# Patient Record
Sex: MALE | Race: WHITE | Employment: FULL TIME | URBAN - METROPOLITAN AREA
[De-identification: names, ages, dates, MRNs, and addresses within clinical notes are randomized per-mention and may not be internally consistent; named-entity substitution may affect disease eponyms.]

---

## 2017-03-24 ENCOUNTER — HOSPITAL ENCOUNTER (OUTPATIENT)
Dept: LAB | Age: 67
Discharge: HOME | End: 2017-03-24

## 2017-03-24 PROCEDURE — 88305 TISSUE EXAM BY PATHOLOGIST: CPT | Performed by: INTERNAL MEDICINE

## 2017-06-14 ENCOUNTER — HOSPITAL ENCOUNTER (OUTPATIENT)
Dept: PHYSICAL THERAPY | Age: 67
Discharge: HOME | End: 2017-06-14
Attending: FAMILY MEDICINE
Payer: COMMERCIAL

## 2017-06-14 DIAGNOSIS — M54.2 CERVICALGIA: ICD-10-CM

## 2017-06-14 PROCEDURE — 97110 THERAPEUTIC EXERCISES: CPT

## 2017-06-14 PROCEDURE — 97162 PT EVAL MOD COMPLEX 30 MIN: CPT

## 2017-06-14 NOTE — PROGRESS NOTES
Ambulatory/Rehab Services H2 Model Falls Risk Assessment    Risk Factor Pts. ·   Confusion/Disorientation/Impulsivity  []    4 ·   Symptomatic Depression  []   2 ·   Altered Elimination  []   1 ·   Dizziness/Vertigo  []   1 ·   Gender (Male)  [x]   1 ·   Any administered antiepileptics (anticonvulsants):  []   2 ·   Any administered benzodiazepines:  []   1 ·   Visual Impairment (specify):  []   1 ·   Portable Oxygen Use  []   1 ·   Orthostatic ? BP  []   1 ·   History of Recent Falls (within 3 mos.)  []   5     Ability to Rise from Chair (choose one) Pts. ·   Ability to rise in a single movement  []   0 ·   Pushes up, successful in one attempt  []   1 ·   Multiple attempts, but successful  []   3 ·   Unable to rise without assistance  []   4   Total: (5 or greater = High Risk) 1     Falls Prevention Plan:   []                Physical Limitations to Exercise (specify):   []                Mobility Assistance Device (type):   []                Exercise/Equipment Adaptation (specify):    ©2010 [facility] [place] Patent #[id number].  Federal Law prohibits the replication, distribution or use without written permission from [facility] [employer]

## 2017-06-14 NOTE — PROGRESS NOTES
Rainer Munguia  : 1950 [facility] [place] [facility] [address] Therapy  [address], [address]  [phone]   [phone]          OUTPATIENT PHYSICAL THERAPY:Initial Assessment 2017    ICD-10: Treatment Diagnosis: M54.2  Cervicalgia  Precautions/Allergies:   Pcn [penicillins]   Fall Risk Score: 1 (? 5 = High Risk)  MD Orders: eval and treat MEDICAL/REFERRING DIAGNOSIS:  Cervicalgia [M54.2]   DATE OF ONSET: about 2 months ago  REFERRING PHYSICIAN: Kenna Rojas MD  RETURN PHYSICIAN APPOINTMENT: TBD     INITIAL ASSESSMENT:  Mr. Valerie Perkins presents with pain in the R arm, from the base of the neck/upper trap area to the hand, most commonly the lateral aspect of the hand. The pain is unchanging since onset, came on for no apparent reason,  but intermittent. At present the pain is more in the arm than the neck. It is worse early in the day and gets better as the day goes. No particular position or activity seem to make it better or worse. But it does feel some better if he stretches it, with a traction-type pull. The pain does interrupt his sleeping. He reported that recent xrays did show a possible old compression fx in the cervical region. He did have R rotator cuff repair, labral repair in , and still has some shoulder pain from that that seems to confuse his current symptoms. PROBLEM LIST (Impacting functional limitations):  1. Decreased Strength  2. Decreased ADL/Functional Activities  3. Increased Pain  4. Decreased Activity Tolerance  5. Decreased Flexibility/Joint Mobility  6. Decreased [place] with Home Exercise Program INTERVENTIONS PLANNED:  1. Home Exercise Program (HEP)  2. Manual Therapy  3. Range of Motion (ROM)  4. Therapeutic Exercise/Strengthening  5. Modalities if needed/appropriate   TREATMENT PLAN:  Effective Dates: 2017 TO 9-.   Frequency/Duration: 2 times a week for 6 weeks, and upon reassessment will adjust frequency and duration as progress indicates. GOALS: (Goals have been discussed and agreed upon with patient.)  :   SHORT-TERM FUNCTIONAL GOALS: Time Frame: 3 weeks  Decrease pain by 3/10 to improve ease with driving and sleeping. Improve cervical ROM by 15% for improved function with ADL's  Establish independence in HEP with minimal cueing  Be able to demonstrate correct sitting posture to minimize spinal stress  DISCHARGE GOALS: Time Frame: 12 weeks  Decrease pain to <3/10 for greater ease with driving and light lifting ADL's  Improve cervical AROM to minimal limitation (or better) for all cervical & UE ADL's  Improve score on NDI by > 5 points to allow progression to independent program  Progression to advanced HEP with no cueing to allow discharge from therapy. Rehabilitation Potential For Stated Goals: Good  Regarding Kashmir Guerra's therapy, I certify that the treatment plan above will be carried out by a therapist or under their direction. Thank you for this referral,  Vida Morgan PT     Referring Physician Signature: Lakisha Joe MD              Date                    The information in this section was collected on 6- (except where otherwise noted). HISTORY:   History of Present Injury/Illness (Reason for Referral):  Pt reported that he developed neck and R arm pain for no apparent reason about 2 months ago. The pain has been unchanging since then, intermittent, but significant. Currently the pain in the R arm is worse than the neck pain. He denies any tingling, just radiating pain. H has not found any one motion or position that makes the pain worse, but when he stretches the arm with a traction force it is better. He does have increased stiffness in the neck region, much worse in the morning. Past Medical History/Comorbidities:   Mr. Poly Hylton  has a past medical history of AR (allergic rhinitis); Dyslipidemia; and Pathologic fracture, unspecified site.   Mr. Poly Hylton  has a past surgical history that includes knee arthroscopy (1998); mohs procedure (Right, 2016); and shoulder arthroscopy (Right, 2015). Social History/Living Environment:     . Lives in 1 level home  Prior Level of Function/Work/Activity:  Works as an  but is up and down more than long prolonged sitting  Dominant Side:         RIGHT  Current Medications:  Pt took Naproxen for about 2 weeks but it did not help and other sx made him stop     Current Outpatient Prescriptions:     naproxen (NAPROSYN) 500 mg tablet, 1 po bid, Disp: 60 Tab, Rfl: 11    atorvastatin (LIPITOR) 10 mg tablet, 1/2 po qd, Disp: 30 Tab, Rfl: 11    triamcinolone acetonide (KENALOG) 0.1 % topical cream, Apply  to affected area two (2) times a day., Disp: 45 g, Rfl: 11   Date Last Reviewed:  6/14/2017     Number of Personal Factors/Comorbidities that affect the Plan of Care: 1-2: MODERATE COMPLEXITY   EXAMINATION:   Observation/Orthostatic Postural Assessment:          Pt moves without apparent pain in transitions. Seated posture is Poor, with forward head and upper cervical hyperextension. Correction of posture had no effect on sx. ROM:          Pt has Moderate limitations of retraction and extension. Moderate to minimal limitations of rotation to R and L. Min limitations of lateral flexion L and R.   Strength:          R shoulder flexion and abd and ER are slightly weaker than L, due to RCR, probably not due to any cervical pathology. Body Structures Involved:  1. Nerves  2. Bones  3. Joints  4. Muscles Body Functions Affected:  1. Sensory/Pain  2. Neuromusculoskeletal  3. Movement Related Activities and Participation Affected:  1. General Tasks and Demands  2. Self Care  3. Domestic Life  4.  Community, Social and [place]   Number of elements (examined above) that affect the Plan of Care: 3: MODERATE COMPLEXITY   CLINICAL PRESENTATION:   Presentation: Evolving clinical presentation with changing clinical characteristics: MODERATE COMPLEXITY   CLINICAL DECISION MAKING:   Outcome Measure: Tool Used: Neck Disability Index (NDI)  Score:  Initial: 2/50  Most Recent: X/50 (Date: -- )   Interpretation of Score: The Neck Disability Index is a revised form of the Oswestry Low Back Pain Index and is designed to measure the activities of daily living in person's with neck pain. Each section is scored on a 0-5 scale, 5 representing the greatest disability. The scores of each section are added together for a total score of 50. Score 0 1-10 11-20 21-30 31-40 41-49 50   Modifier CH CI CJ CK CL CM CN       Medical Necessity:   · Skilled intervention continues to be required due to neck and arm pain that interfere with work, leisure, comfort. .  Reason for Services/Other Comments:  · Patient continues to demonstrate capacity to improve flexibility and strength which will decrease pain and improve functional mobility and comfort. .   Use of outcome tool(s) and clinical judgement create a POC that gives a: Questionable prediction of patient's progress: MODERATE COMPLEXITY            TREATMENT:   (In addition to Assessment/Re-Assessment sessions the following treatments were rendered)  Pre-treatment Symptoms/Complaints:  Pt reports R shoulder and arm pain that seems to be coming from the neck. Pain: Initial:   Pain Intensity 1: 1  Post Session:  0     Evaluation  (X)  Therapeutic Exercise (  20 min):   to decrease pain, improve flexibility and motion, and increase strength. Will provide verbal and manual cues as needed to ensure proper performance of the exercises. Will increase range of motion, resistance and intensity as pt tolerates. Pt was taught cervical retraction, seated. Needed moderate physical cues to perform ex properly, and to try to get to full range.  , Performed 2-3 sets of 8-10 reps. Did retraction with head off the mat, with min traction. Again needed mod asst to get good retraction. Made nice progress with range. Added extn after retraction. Could only tolerate about 50% extn range with head off the table. Pt did seated retraction and extn after the supine ex. Did much better with this. Symptoms after seated exercise were less, no arm pain, and improved cervical rotation. Treatment/Session Assessment:  Pt tolerated treatment well today. He has R arm pain that seems suspicious of a cervical problem rather than R shoulder. He is s/p R rotator cuff and labral repair about 1 1/2 yrs ago, and does still have some weakness and pain with lifting at times. Sometimes it seemed difficult for him to tell the present sx from the old problems. He did show nice improvement in cervical range of motion with today's treatment, and a decrease in the arm pain, with some movement of the pain in a proximal direction. He is expected to continue to benefit from PT.   · Response to Treatment:  Improved motion, decrease and centralising of sx. · Compliance with Program/Exercises: Will assess as treatment progresses. · Recommendations/Intent for next treatment session:   Next visit will focus on decreasing pain, improving cervical and R shoulder motion and function, and improved comfort.  .  ·   Total Treatment Duration:   60 min  Total number of treatments:   1    PT Patient Time In/Time Out  Time In: 0400  Time Out: 0500    Agusto Lopez PT

## 2017-06-20 ENCOUNTER — HOSPITAL ENCOUNTER (OUTPATIENT)
Dept: PHYSICAL THERAPY | Age: 67
Discharge: HOME | End: 2017-06-20
Attending: FAMILY MEDICINE
Payer: COMMERCIAL

## 2017-06-20 NOTE — PROGRESS NOTES
Khadijah Oconnor  : 1950 [facility] [place] [facility] [address] Therapy  [address] [place] [address], [address]  [phone]   [phone]        OUTPATIENT DAILY NOTE    NAME/AGE/GENDER: Khadijah Oconnor is a 78 y.o. male. DATE: 2017    Patient cancelled this appointment today due to a pre-existing conflict. Will plan to follow up on next scheduled visit.     Katarina Ramos, PT

## 2017-06-22 ENCOUNTER — HOSPITAL ENCOUNTER (OUTPATIENT)
Dept: PHYSICAL THERAPY | Age: 67
Discharge: HOME | End: 2017-06-22
Attending: FAMILY MEDICINE
Payer: COMMERCIAL

## 2017-06-22 PROCEDURE — 97012 MECHANICAL TRACTION THERAPY: CPT

## 2017-06-22 PROCEDURE — 97110 THERAPEUTIC EXERCISES: CPT

## 2017-06-22 NOTE — PROGRESS NOTES
Tammy Ivey  : 1950 [facility]  [address], [address]  [phone]   [phone]          OUTPATIENT PHYSICAL THERAPY:Daily Note 2017    ICD-10: Treatment Diagnosis: M54.2  Cervicalgia  Precautions/Allergies:   Pcn [penicillins]   Fall Risk Score: 1 (? 5 = High Risk)  MD Orders: eval and treat MEDICAL/REFERRING DIAGNOSIS:  Cervicalgia [M54.2]   DATE OF ONSET: about 2 months ago  REFERRING PHYSICIAN: Ismael Alvarez MD  RETURN PHYSICIAN APPOINTMENT: TBD     INITIAL ASSESSMENT:  Mr. Joya Gottlieb presents with pain in the R arm, from the base of the neck/upper trap area to the hand, most commonly the lateral aspect of the hand. The pain is unchanging since onset, came on for no apparent reason,  but intermittent. At present the pain is more in the arm than the neck. It is worse early in the day and gets better as the day goes. No particular position or activity seem to make it better or worse. But it does feel some better if he stretches it, with a traction-type pull. The pain does interrupt his sleeping. He reported that recent xrays did show a possible old compression fx in the cervical region. He did have R rotator cuff repair, labral repair in , and still has some shoulder pain from that that seems to confuse his current symptoms. PROBLEM LIST (Impacting functional limitations):  1. Decreased Strength  2. Decreased ADL/Functional Activities  3. Increased Pain  4. Decreased Activity Tolerance  5. Decreased Flexibility/Joint Mobility  6. Decreased [place] with Home Exercise Program INTERVENTIONS PLANNED:  1. Home Exercise Program (HEP)  2. Manual Therapy  3. Range of Motion (ROM)  4. Therapeutic Exercise/Strengthening  5. Modalities if needed/appropriate   TREATMENT PLAN:  Effective Dates: 2017 TO 9-.   Frequency/Duration: 2 times a week for 6 weeks, and upon reassessment will adjust frequency and duration as progress indicates. GOALS: (Goals have been discussed and agreed upon with patient.)  :   SHORT-TERM FUNCTIONAL GOALS: Time Frame: 3 weeks  Decrease pain by 3/10 to improve ease with driving and sleeping. Improve cervical ROM by 15% for improved function with ADL's  Establish independence in HEP with minimal cueing  Be able to demonstrate correct sitting posture to minimize spinal stress  DISCHARGE GOALS: Time Frame: 12 weeks  Decrease pain to <3/10 for greater ease with driving and light lifting ADL's  Improve cervical AROM to minimal limitation (or better) for all cervical & UE ADL's  Improve score on NDI by > 5 points to allow progression to independent program  Progression to advanced HEP with no cueing to allow discharge from therapy. Rehabilitation Potential For Stated Goals: Good  Regarding Kashmir Guerra's therapy, I certify that the treatment plan above will be carried out by a therapist or under their direction. Thank you for this referral,  Albaro Khan, PT     Referring Physician Signature: Sheyla Bejarano MD              Date                    The information in this section was collected on 6- (except where otherwise noted). HISTORY:   History of Present Injury/Illness (Reason for Referral):  Pt reported that he developed neck and R arm pain for no apparent reason about 2 months ago. The pain has been unchanging since then, intermittent, but significant. Currently the pain in the R arm is worse than the neck pain. He denies any tingling, just radiating pain. H has not found any one motion or position that makes the pain worse, but when he stretches the arm with a traction force it is better. He does have increased stiffness in the neck region, much worse in the morning. Past Medical History/Comorbidities:   Mr. Rohan Thomason  has a past medical history of AR (allergic rhinitis); Dyslipidemia; and Pathologic fracture, unspecified site.   Mr. Rohan Thomason  has a past surgical history that includes knee arthroscopy (1998); mohs procedure (Right, 2016); and shoulder arthroscopy (Right, 2015). Social History/Living Environment:     . Lives in 1 level home  Prior Level of Function/Work/Activity:  Works as an  but is up and down more than long prolonged sitting  Dominant Side:         RIGHT  Current Medications:  Pt took Naproxen for about 2 weeks but it did not help and other sx made him stop     Current Outpatient Prescriptions:     naproxen (NAPROSYN) 500 mg tablet, 1 po bid, Disp: 60 Tab, Rfl: 11    atorvastatin (LIPITOR) 10 mg tablet, 1/2 po qd, Disp: 30 Tab, Rfl: 11    triamcinolone acetonide (KENALOG) 0.1 % topical cream, Apply  to affected area two (2) times a day., Disp: 45 g, Rfl: 11   Date Last Reviewed:  6/22/2017     Number of Personal Factors/Comorbidities that affect the Plan of Care: 1-2: MODERATE COMPLEXITY   EXAMINATION:   Observation/Orthostatic Postural Assessment:          Pt moves without apparent pain in transitions. Seated posture is Poor, with forward head and upper cervical hyperextension. Correction of posture had no effect on sx. ROM:          Pt has Moderate limitations of retraction and extension. Moderate to minimal limitations of rotation to R and L. Min limitations of lateral flexion L and R.   Strength:          R shoulder flexion and abd and ER are slightly weaker than L, due to RCR, probably not due to any cervical pathology. Body Structures Involved:  1. Nerves  2. Bones  3. Joints  4. Muscles Body Functions Affected:  1. Sensory/Pain  2. Neuromusculoskeletal  3. Movement Related Activities and Participation Affected:  1. General Tasks and Demands  2. Self Care  3. Domestic Life  4.  Community, Social and [place]   Number of elements (examined above) that affect the Plan of Care: 3: MODERATE COMPLEXITY   CLINICAL PRESENTATION:   Presentation: Evolving clinical presentation with changing clinical characteristics: MODERATE COMPLEXITY   CLINICAL DECISION MAKING:   Outcome Measure: Tool Used: Neck Disability Index (NDI)  Score:  Initial: 2/50  Most Recent: X/50 (Date: -- )   Interpretation of Score: The Neck Disability Index is a revised form of the Oswestry Low Back Pain Index and is designed to measure the activities of daily living in person's with neck pain. Each section is scored on a 0-5 scale, 5 representing the greatest disability. The scores of each section are added together for a total score of 50. Score 0 1-10 11-20 21-30 31-40 41-49 50   Modifier CH CI CJ CK CL CM CN       Medical Necessity:   · Skilled intervention continues to be required due to neck and arm pain that interfere with work, leisure, comfort. .  Reason for Services/Other Comments:  · Patient continues to demonstrate capacity to improve flexibility and strength which will decrease pain and improve functional mobility and comfort. .   Use of outcome tool(s) and clinical judgement create a POC that gives a: Questionable prediction of patient's progress: MODERATE COMPLEXITY            TREATMENT:   (In addition to Assessment/Re-Assessment sessions the following treatments were rendered)    Pre-treatment Symptoms/Complaints:  Pt reports that his neck pain does not seem to be as bad,  The R arm does not hurt as far down now, and the tingling is nearly gone. Pain: Initial:   Pain Intensity 1: 2  Post Session:    1       Therapeutic Exercise ( 50 min):   to decrease pain, improve flexibility and motion, and increase strength. Will provide verbal and manual cues as needed to ensure proper performance of the exercises. Will increase range of motion, resistance and intensity as pt tolerates. Rechecked motion baselines:  Retraction and extn have improved since last visit. Still min to mod limited for lateral flexion. Rotation now minimally limited. Pt did seated retraction  with therapist overpressure. Did much better with this.   Symptoms after seated exercise were less, no arm pain, and improved cervical rotation    Did retraction extn with head off the mat, with min traction. Again needed mod asst to get good retraction. Several sets of 5-8 reps, with brief rest between sets. Made nice progress with range. Gained nice range for extn with head off table. Also did PROM for rotation and lateral flexion, with min overpressure. Modalities  ( 10 min) : Intermittent cervical traction, 10 min at 16 #. Cervical traction for axial stretching and decompression. Treatment/Session Assessment:  Pt tolerated treatment well today. He reported that he has been compliant with ex at home, and his range has improved. He did show nice improvement in cervical range of motion with today's treatment, and a decrease in the arm pain, with some continued  movement of the pain in a proximal direction. ( from elbow up to proximal upper arm)  He is expected to continue to benefit from PT.     · Response to Treatment:  Improved motion, decrease and centralising of sx. · Compliance with Program/Exercises: Will assess as treatment progresses. · Recommendations/Intent for next treatment session:   Next visit will focus on decreasing pain, improving cervical and R shoulder motion and function, and improved comfort.  .  ·   Total Treatment Duration:   60 min  Total number of treatments:   2    PT Patient Time In/Time Out  Time In: 3878  Time Out: Sandra Denise, PT

## 2017-06-27 ENCOUNTER — HOSPITAL ENCOUNTER (OUTPATIENT)
Dept: PHYSICAL THERAPY | Age: 67
Discharge: HOME | End: 2017-06-27
Attending: FAMILY MEDICINE
Payer: COMMERCIAL

## 2017-06-27 PROCEDURE — 97012 MECHANICAL TRACTION THERAPY: CPT

## 2017-06-27 PROCEDURE — 97110 THERAPEUTIC EXERCISES: CPT

## 2017-06-27 PROCEDURE — 97140 MANUAL THERAPY 1/> REGIONS: CPT

## 2017-06-27 NOTE — PROGRESS NOTES
Mendel North  : 1950 [facility]  [address], [facility], [address]  [phone]   [phone]          OUTPATIENT PHYSICAL THERAPY:Daily Note 2017    ICD-10: Treatment Diagnosis: M54.2  Cervicalgia  Precautions/Allergies:   Pcn [penicillins]   Fall Risk Score: 1 (? 5 = High Risk)  MD Orders: eval and treat MEDICAL/REFERRING DIAGNOSIS:  Cervicalgia [M54.2]   DATE OF ONSET: about 2 months ago  REFERRING PHYSICIAN: Neeta Garvey MD  RETURN PHYSICIAN APPOINTMENT: TBD     INITIAL ASSESSMENT:  Mr. Allyn Asher presents with pain in the R arm, from the base of the neck/upper trap area to the hand, most commonly the lateral aspect of the hand. The pain is unchanging since onset, came on for no apparent reason,  but intermittent. At present the pain is more in the arm than the neck. It is worse early in the day and gets better as the day goes. No particular position or activity seem to make it better or worse. But it does feel some better if he stretches it, with a traction-type pull. The pain does interrupt his sleeping. He reported that recent xrays did show a possible old compression fx in the cervical region. He did have R rotator cuff repair, labral repair in , and still has some shoulder pain from that that seems to confuse his current symptoms. PROBLEM LIST (Impacting functional limitations):  1. Decreased Strength  2. Decreased ADL/Functional Activities  3. Increased Pain  4. Decreased Activity Tolerance  5. Decreased Flexibility/Joint Mobility  6. Decreased [place] with Home Exercise Program INTERVENTIONS PLANNED:  1. Home Exercise Program (HEP)  2. Manual Therapy  3. Range of Motion (ROM)  4. Therapeutic Exercise/Strengthening  5. Modalities if needed/appropriate   TREATMENT PLAN:  Effective Dates: 2017 TO 9-.   Frequency/Duration: 2 times a week for 6 weeks, and upon reassessment will adjust frequency and duration as progress indicates. GOALS: (Goals have been discussed and agreed upon with patient.)  :   SHORT-TERM FUNCTIONAL GOALS: Time Frame: 3 weeks  Decrease pain by 3/10 to improve ease with driving and sleeping. Improve cervical ROM by 15% for improved function with ADL's  Establish independence in HEP with minimal cueing  Be able to demonstrate correct sitting posture to minimize spinal stress  DISCHARGE GOALS: Time Frame: 12 weeks  Decrease pain to <3/10 for greater ease with driving and light lifting ADL's  Improve cervical AROM to minimal limitation (or better) for all cervical & UE ADL's  Improve score on NDI by > 5 points to allow progression to independent program  Progression to advanced HEP with no cueing to allow discharge from therapy. Rehabilitation Potential For Stated Goals: Good  Regarding Kashmir Guerra's therapy, I certify that the treatment plan above will be carried out by a therapist or under their direction. Thank you for this referral,  Rock Anglin PT     Referring Physician Signature: Valentino Balint, MD              Date                    The information in this section was collected on 6- (except where otherwise noted). HISTORY:   History of Present Injury/Illness (Reason for Referral):  Pt reported that he developed neck and R arm pain for no apparent reason about 2 months ago. The pain has been unchanging since then, intermittent, but significant. Currently the pain in the R arm is worse than the neck pain. He denies any tingling, just radiating pain. H has not found any one motion or position that makes the pain worse, but when he stretches the arm with a traction force it is better. He does have increased stiffness in the neck region, much worse in the morning. Past Medical History/Comorbidities:   Mr. Lucinda Bernal  has a past medical history of AR (allergic rhinitis); Dyslipidemia; and Pathologic fracture, unspecified site.   Mr. Lucinda Bernal  has a past surgical history that includes knee arthroscopy (1998); mohs procedure (Right, 2016); and shoulder arthroscopy (Right, 2015). Social History/Living Environment:     . Lives in 1 level home  Prior Level of Function/Work/Activity:  Works as an  but is up and down more than long prolonged sitting  Dominant Side:         RIGHT  Current Medications:  Pt took Naproxen for about 2 weeks but it did not help and other sx made him stop     Current Outpatient Prescriptions:     naproxen (NAPROSYN) 500 mg tablet, 1 po bid, Disp: 60 Tab, Rfl: 11    atorvastatin (LIPITOR) 10 mg tablet, 1/2 po qd, Disp: 30 Tab, Rfl: 11    triamcinolone acetonide (KENALOG) 0.1 % topical cream, Apply  to affected area two (2) times a day., Disp: 45 g, Rfl: 11   Date Last Reviewed:  6/27/2017     Number of Personal Factors/Comorbidities that affect the Plan of Care: 1-2: MODERATE COMPLEXITY   EXAMINATION:   Observation/Orthostatic Postural Assessment:          Pt moves without apparent pain in transitions. Seated posture is Poor, with forward head and upper cervical hyperextension. Correction of posture had no effect on sx. ROM:          Pt has Moderate limitations of retraction and extension. Moderate to minimal limitations of rotation to R and L. Min limitations of lateral flexion L and R.   Strength:          R shoulder flexion and abd and ER are slightly weaker than L, due to RCR, probably not due to any cervical pathology. Body Structures Involved:  1. Nerves  2. Bones  3. Joints  4. Muscles Body Functions Affected:  1. Sensory/Pain  2. Neuromusculoskeletal  3. Movement Related Activities and Participation Affected:  1. General Tasks and Demands  2. Self Care  3. Domestic Life  4.  Community, Social and [place]   Number of elements (examined above) that affect the Plan of Care: 3: MODERATE COMPLEXITY   CLINICAL PRESENTATION:   Presentation: Evolving clinical presentation with changing clinical characteristics: MODERATE COMPLEXITY   CLINICAL DECISION MAKING:   Outcome Measure: Tool Used: Neck Disability Index (NDI)  Score:  Initial: 2/50  Most Recent: X/50 (Date: -- )   Interpretation of Score: The Neck Disability Index is a revised form of the Oswestry Low Back Pain Index and is designed to measure the activities of daily living in person's with neck pain. Each section is scored on a 0-5 scale, 5 representing the greatest disability. The scores of each section are added together for a total score of 50. Score 0 1-10 11-20 21-30 31-40 41-49 50   Modifier CH CI CJ CK CL CM CN       Medical Necessity:   · Skilled intervention continues to be required due to neck and arm pain that interfere with work, leisure, comfort. .  Reason for Services/Other Comments:  · Patient continues to demonstrate capacity to improve flexibility and strength which will decrease pain and improve functional mobility and comfort. .   Use of outcome tool(s) and clinical judgement create a POC that gives a: Questionable prediction of patient's progress: MODERATE COMPLEXITY            TREATMENT:   (In addition to Assessment/Re-Assessment sessions the following treatments were rendered)    Pre-treatment Symptoms/Complaints:  Pt reports that his neck pain does not seem to be as bad, less frequently. The tingling is gone and the forearm pain is gone. Still occasional min pain around the shoulder. Pain: Initial:   Pain Intensity 1: 1  Post Session:    1       Therapeutic Exercise ( 35 min):   to decrease pain, improve flexibility and motion, and increase strength. Will provide verbal and manual cues as needed to ensure proper performance of the exercises. Will increase range of motion, resistance and intensity as pt tolerates. Rechecked motion baselines:  Retraction and extn have improved since last visit. Still min  limited for lateral flexion.   Rotation now minimally limited, equal L and R.     Pt did seated retraction extn--1 set of 10---good range and no pain  Did extn stretch for upper thoracic spine, with hands at base of the neck. Nice extn range with this. Did retraction extn with head off the mat, with min traction. Did 1-2 sets of 5-8 reps, with brief rest between sets. Made nice progress with extn range. Shoulder shrugs--10#--20 reps  Face pulls--yellow sportscord--1 x 15  Shoulder extn--yellow sportscord---1 x 15  bilat standing chest press--yellow sportscord--1 x 15    Manual Therapy  (10 min): Soft tissue mobs for cervical paraspinals, manual traction, PROM for retraction and extn        Modalities  ( 15 min) : Intermittent cervical traction, 15 min at 18#. Cervical traction for axial stretching and decompression. Treatment/Session Assessment:  Pt tolerated treatment well today. He did show nice improvement in cervical range of motion with today's treatment, and a decrease in the arm pain, with some continued  movement of the pain in a proximal direction. He no longer has any forearm pain or any tingling in the hand. He seems pleased with PT so far. ·  He reported that he has been compliant with ex at home. ·   · Response to Treatment:  Improved motion, decrease in pain and centralising of sx. · Compliance with Program/Exercises: Will assess as treatment progresses. · Recommendations/Intent for next treatment session:   Next visit will focus on decreasing pain, improving cervical and R shoulder motion and function, and improved comfort.  .  ·   Total Treatment Duration:   60 min  Total number of treatments:   3    PT Patient Time In/Time Out  Time In: 0400  Time Out: 0500    Kris Vazquez PT

## 2017-06-29 ENCOUNTER — HOSPITAL ENCOUNTER (OUTPATIENT)
Dept: PHYSICAL THERAPY | Age: 67
Discharge: HOME | End: 2017-06-29
Attending: FAMILY MEDICINE
Payer: COMMERCIAL

## 2017-06-29 PROCEDURE — 97140 MANUAL THERAPY 1/> REGIONS: CPT

## 2017-06-29 PROCEDURE — 97110 THERAPEUTIC EXERCISES: CPT

## 2017-06-29 NOTE — PROGRESS NOTES
Kirt Rubin  : 1950 [facility] [place] [address] [facility]  [address] [place] [address], [address]  [phone]   [phone]          OUTPATIENT PHYSICAL THERAPY:Daily Note 2017    ICD-10: Treatment Diagnosis: M54.2  Cervicalgia  Precautions/Allergies:   Pcn [penicillins]   Fall Risk Score: 1 (? 5 = High Risk)  MD Orders: eval and treat MEDICAL/REFERRING DIAGNOSIS:  Cervicalgia [M54.2]   DATE OF ONSET: about 2 months ago  REFERRING PHYSICIAN: Eugene Kirkland MD  RETURN PHYSICIAN APPOINTMENT: TBD     INITIAL ASSESSMENT:  Mr. Toya Holt presents with pain in the R arm, from the base of the neck/upper trap area to the hand, most commonly the lateral aspect of the hand. The pain is unchanging since onset, came on for no apparent reason,  but intermittent. At present the pain is more in the arm than the neck. It is worse early in the day and gets better as the day goes. No particular position or activity seem to make it better or worse. But it does feel some better if he stretches it, with a traction-type pull. The pain does interrupt his sleeping. He reported that recent xrays did show a possible old compression fx in the cervical region. He did have R rotator cuff repair, labral repair in , and still has some shoulder pain from that that seems to confuse his current symptoms. PROBLEM LIST (Impacting functional limitations):  1. Decreased Strength  2. Decreased ADL/Functional Activities  3. Increased Pain  4. Decreased Activity Tolerance  5. Decreased Flexibility/Joint Mobility  6. Decreased [place] with Home Exercise Program INTERVENTIONS PLANNED:  1. Home Exercise Program (HEP)  2. Manual Therapy  3. Range of Motion (ROM)  4. Therapeutic Exercise/Strengthening  5. Modalities if needed/appropriate   TREATMENT PLAN:  Effective Dates: 2017 TO 9-.   Frequency/Duration: 2 times a week for 6 weeks, and upon reassessment will adjust frequency and duration as progress indicates. GOALS: (Goals have been discussed and agreed upon with patient.)  :   SHORT-TERM FUNCTIONAL GOALS: Time Frame: 3 weeks  Decrease pain by 3/10 to improve ease with driving and sleeping. Improve cervical ROM by 15% for improved function with ADL's  Establish independence in HEP with minimal cueing  Be able to demonstrate correct sitting posture to minimize spinal stress  DISCHARGE GOALS: Time Frame: 12 weeks  Decrease pain to <3/10 for greater ease with driving and light lifting ADL's  Improve cervical AROM to minimal limitation (or better) for all cervical & UE ADL's  Improve score on NDI by > 5 points to allow progression to independent program  Progression to advanced HEP with no cueing to allow discharge from therapy. Rehabilitation Potential For Stated Goals: Good  Regarding Kashmir Guerra's therapy, I certify that the treatment plan above will be carried out by a therapist or under their direction. Thank you for this referral,  Kris Vazquez PT     Referring Physician Signature: Aaron Scherer MD              Date                    The information in this section was collected on 6- (except where otherwise noted). HISTORY:   History of Present Injury/Illness (Reason for Referral):  Pt reported that he developed neck and R arm pain for no apparent reason about 2 months ago. The pain has been unchanging since then, intermittent, but significant. Currently the pain in the R arm is worse than the neck pain. He denies any tingling, just radiating pain. H has not found any one motion or position that makes the pain worse, but when he stretches the arm with a traction force it is better. He does have increased stiffness in the neck region, much worse in the morning. Past Medical History/Comorbidities:   Mr. Ayad Rodriguez  has a past medical history of AR (allergic rhinitis); Dyslipidemia; and Pathologic fracture, unspecified site.   Mr. Ayad Rodriguez  has a past surgical history that includes knee arthroscopy (1998); mohs procedure (Right, 2016); and shoulder arthroscopy (Right, 2015). Social History/Living Environment:     . Lives in 1 level home  Prior Level of Function/Work/Activity:  Works as an  but is up and down more than long prolonged sitting  Dominant Side:         RIGHT  Current Medications:  Pt took Naproxen for about 2 weeks but it did not help and other sx made him stop     Current Outpatient Prescriptions:     naproxen (NAPROSYN) 500 mg tablet, 1 po bid, Disp: 60 Tab, Rfl: 11    atorvastatin (LIPITOR) 10 mg tablet, 1/2 po qd, Disp: 30 Tab, Rfl: 11    triamcinolone acetonide (KENALOG) 0.1 % topical cream, Apply  to affected area two (2) times a day., Disp: 45 g, Rfl: 11   Date Last Reviewed:  6/29/2017     Number of Personal Factors/Comorbidities that affect the Plan of Care: 1-2: MODERATE COMPLEXITY   EXAMINATION:   Observation/Orthostatic Postural Assessment:          Pt moves without apparent pain in transitions. Seated posture is Poor, with forward head and upper cervical hyperextension. Correction of posture had no effect on sx. ROM:          Pt has Moderate limitations of retraction and extension. Moderate to minimal limitations of rotation to R and L. Min limitations of lateral flexion L and R.   Strength:          R shoulder flexion and abd and ER are slightly weaker than L, due to RCR, probably not due to any cervical pathology. Body Structures Involved:  1. Nerves  2. Bones  3. Joints  4. Muscles Body Functions Affected:  1. Sensory/Pain  2. Neuromusculoskeletal  3. Movement Related Activities and Participation Affected:  1. General Tasks and Demands  2. Self Care  3. Domestic Life  4.  Community, Social and [place]   Number of elements (examined above) that affect the Plan of Care: 3: MODERATE COMPLEXITY   CLINICAL PRESENTATION:   Presentation: Evolving clinical presentation with changing clinical characteristics: MODERATE COMPLEXITY   CLINICAL DECISION MAKING:   Outcome Measure: Tool Used: Neck Disability Index (NDI)  Score:  Initial: 2/50  Most Recent: X/50 (Date: -- )   Interpretation of Score: The Neck Disability Index is a revised form of the Oswestry Low Back Pain Index and is designed to measure the activities of daily living in person's with neck pain. Each section is scored on a 0-5 scale, 5 representing the greatest disability. The scores of each section are added together for a total score of 50. Score 0 1-10 11-20 21-30 31-40 41-49 50   Modifier CH CI CJ CK CL CM CN       Medical Necessity:   · Skilled intervention continues to be required due to neck and arm pain that interfere with work, leisure, comfort. .  Reason for Services/Other Comments:  · Patient continues to demonstrate capacity to improve flexibility and strength which will decrease pain and improve functional mobility and comfort. .   Use of outcome tool(s) and clinical judgement create a POC that gives a: Questionable prediction of patient's progress: MODERATE COMPLEXITY            TREATMENT:   (In addition to Assessment/Re-Assessment sessions the following treatments were rendered)    Pre-treatment Symptoms/Complaints:  Pt reports that his neck pain does not seem to scotty problem now. Motion is good. Very occasional very minimal  pain around the shoulder. Pain: Initial:   Pain Intensity 1: 1  Post Session:    1       Therapeutic Exercise ( 35 min):   to decrease pain, improve flexibility and motion, and increase strength. Will provide verbal and manual cues as needed to ensure proper performance of the exercises. Will increase range of motion, resistance and intensity as pt tolerates. Rechecked motion baselines:  Retraction and extn are some better since last visit. Still very minimal limitation for rotation, L slightly more than R. Did retraction extn with head off the mat, with min traction.     Did 1-2 sets of 5-8 reps, with brief rest between sets.   Made nice progress with extn range. Shoulder shrugs--10#--20 reps  Face pulls--red  sportscord--1 x 15  Shoulder extn--red sportscord---1 x 15  bilat standing chest press--yellow sportscord--1 x 15    Manual Therapy  (10 min): Soft tissue mobs for cervical paraspinals, manual traction, PROM for retraction and extn        Treatment/Session Assessment:  Pt tolerated treatment well today. He has shown nice improvement in cervical range of motion PT and a decrease in the arm pain. He no longer has any forearm pain or any tingling in the hand. He seems pleased with PT so far. He wants to take a week off from therapy to see how he does, and only return if some symptoms return. ·  He reported that he has been compliant with ex at home. ·   · Response to Treatment:  Improved motion, decrease in pain and centralising of sx. · Compliance with Program/Exercises: Will assess as treatment progresses.   · Recommendations/Intent for next treatment session:     ·   Total Treatment Duration:   45 min  Total number of treatments:    4    PT Patient Time In/Time Out  Time In: 2081  Time Out: Sandra 58, PT

## 2017-09-10 NOTE — PROGRESS NOTES
Dio Alonzo  : 1950 [facility] [place] [address] [facility]  [address], [address]  [phone]   [facility] [phone]          OUTPATIENT PHYSICAL THERAPY:Discontinuation Summary 9/10/2017    ICD-10: Treatment Diagnosis: M54.2  Cervicalgia  Precautions/Allergies:   Pcn [penicillins]   Fall Risk Score: 1 (? 5 = High Risk)  MD Orders: eval and treat MEDICAL/REFERRING DIAGNOSIS:  Cervicalgia [M54.2]   DATE OF ONSET: about 2 months ago  REFERRING PHYSICIAN: Valentino Balint, MD  RETURN PHYSICIAN APPOINTMENT: TBD      ASSESSMENT:  Mr. Lucinda Bernal presented with pain in the R arm, from the base of the neck/upper trap area to the hand, most commonly the lateral aspect of the hand. The pain is unchanging since onset, came on for no apparent reason,  but intermittent. At present the pain is more in the arm than the neck. It is worse early in the day and gets better as the day goes. No particular position or activity seem to make it better or worse. But it does feel some better if he stretches it, with a traction-type pull. The pain does interrupt his sleeping. He reported that recent xrays did show a possible old compression fx in the cervical region. He did have R rotator cuff repair, labral repair in , and still has some shoulder pain from that that seems to confuse his current symptoms. Pt attended 4 visits to PT, with treatment consisting of stretching, exercises for extension and cervical traction  He made very nice progress and reported that his pain was very minimal and intermittent and tingling was gone. He wanted to hold therapy for about a week to see if he could maintain that pain level, and he never returned for more treatment. Last known status is as reported on . Physical therapy is discontinued.       1.      :   SHORT-TERM FUNCTIONAL GOALS: Time Frame: 3 weeks-- pt met STGs  Decrease pain by 3/10 to improve ease with driving and sleeping. Improve cervical ROM by 15% for improved function with ADL's  Establish independence in HEP with minimal cueing  Be able to demonstrate correct sitting posture to minimize spinal stress  DISCHARGE GOALS: Time Frame: 12 weeks  Decrease pain to <3/10 for greater ease with driving and light lifting ADL's-- goal met  Improve cervical AROM to minimal limitation (or better) for all cervical & UE ADL's--- goal met  Improve score on NDI by > 5 points to allow progression to independent program-- not reassessed  Progression to advanced HEP with no cueing to allow discharge from therapy. -- goal met    Jeraline Rinne, PT